# Patient Record
Sex: MALE | Race: OTHER | URBAN - METROPOLITAN AREA
[De-identification: names, ages, dates, MRNs, and addresses within clinical notes are randomized per-mention and may not be internally consistent; named-entity substitution may affect disease eponyms.]

---

## 2024-01-09 ENCOUNTER — OFFICE VISIT (OUTPATIENT)
Dept: DENTISTRY | Facility: CLINIC | Age: 24
End: 2024-01-09

## 2024-01-09 VITALS — HEART RATE: 65 BPM | TEMPERATURE: 97.3 F | SYSTOLIC BLOOD PRESSURE: 120 MMHG | DIASTOLIC BLOOD PRESSURE: 76 MMHG

## 2024-01-09 DIAGNOSIS — K01.1 IMPACTED THIRD MOLAR TOOTH: ICD-10-CM

## 2024-01-09 DIAGNOSIS — K01.1: Primary | ICD-10-CM

## 2024-01-09 PROCEDURE — D0140 LIMITED ORAL EVALUATION - PROBLEM FOCUSED: HCPCS | Performed by: DENTIST

## 2024-01-09 PROCEDURE — D0330 PANORAMIC RADIOGRAPHIC IMAGE: HCPCS | Performed by: DENTIST

## 2024-01-09 RX ORDER — AMOXICILLIN 500 MG/1
500 TABLET, FILM COATED ORAL EVERY 8 HOURS
Qty: 21 TABLET | Refills: 0 | Status: SHIPPED | OUTPATIENT
Start: 2024-01-09 | End: 2024-01-16

## 2024-01-09 NOTE — DENTAL PROCEDURE DETAILS
"Limited Dental Emergency Evaluation.  Patient (Ann Cai) presents for dental emergency visit.  Patient consent treatment.  RMH, no changes.  ASA: II  Pain score: 6  Chief complain: \"My upper left back tooth is shattered and causing pain\".   HPI: patient reported moderate pain started 3 days ago of his upper left back tooth (wisdom tooth) and felt tooth is shattered.   Radiograph: PAN is taken today.  EOE: WNL.  IOE/Assessment/Plan: tooth #16 is DB positioned with extensive caries and causing traumatic cheek biting. Tooth #17 is impacted with risk of damaging tooth #18. An additional impacted premolar tooth is evident on radiograph of upper left quadrant. Informed and demonstrated to patient the findings on radiograph.   Referral: STAT to [facility] for evaluation and extractions of teeth #16 and #17. Also evaluation, CBCT and treatment as needed for impacted additional premolar tooth on upper left quadrant.   RX: Amoxicillin 500 mg tablets, 21 tablets, TID, for 7 days, no refills. Patient denies allergy.  POI is given. Advised patient to come back for COMP and MX. Patient approved. Patient left satisfied and ambulatory.  Note: patient has two charts, Asked Kendra to merge the charts. IT is requested.   NV: COMP and FMX.   "